# Patient Record
Sex: MALE | Race: WHITE | NOT HISPANIC OR LATINO | Employment: FULL TIME | URBAN - METROPOLITAN AREA
[De-identification: names, ages, dates, MRNs, and addresses within clinical notes are randomized per-mention and may not be internally consistent; named-entity substitution may affect disease eponyms.]

---

## 2017-01-04 PROBLEM — F52.21 ED (ERECTILE DYSFUNCTION) OF NON-ORGANIC ORIGIN: Status: ACTIVE | Noted: 2017-01-04

## 2017-01-04 PROBLEM — I10 HYPERTENSION: Status: ACTIVE | Noted: 2017-01-04

## 2017-01-12 ENCOUNTER — OFFICE VISIT (OUTPATIENT)
Dept: FAMILY MEDICINE CLINIC | Facility: CLINIC | Age: 47
End: 2017-01-12

## 2017-01-12 VITALS
HEART RATE: 90 BPM | HEIGHT: 69 IN | OXYGEN SATURATION: 96 % | WEIGHT: 212 LBS | DIASTOLIC BLOOD PRESSURE: 100 MMHG | SYSTOLIC BLOOD PRESSURE: 140 MMHG | TEMPERATURE: 98.3 F | BODY MASS INDEX: 31.4 KG/M2

## 2017-01-12 DIAGNOSIS — R31.9 HEMATURIA: Primary | ICD-10-CM

## 2017-01-12 DIAGNOSIS — I10 ESSENTIAL HYPERTENSION: ICD-10-CM

## 2017-01-12 DIAGNOSIS — M54.42 ACUTE LEFT-SIDED LOW BACK PAIN WITH LEFT-SIDED SCIATICA: ICD-10-CM

## 2017-01-12 LAB
BILIRUB BLD-MCNC: NEGATIVE MG/DL
CLARITY, POC: CLEAR
COLOR UR: YELLOW
GLUCOSE UR STRIP-MCNC: NEGATIVE MG/DL
KETONES UR QL: NEGATIVE
LEUKOCYTE EST, POC: ABNORMAL
NITRITE UR-MCNC: NEGATIVE MG/ML
PH UR: 5.5 [PH] (ref 5–8)
PROT UR STRIP-MCNC: NEGATIVE MG/DL
RBC # UR STRIP: ABNORMAL /UL
SP GR UR: 1.01 (ref 1–1.03)
UROBILINOGEN UR QL: NORMAL

## 2017-01-12 PROCEDURE — 99214 OFFICE O/P EST MOD 30 MIN: CPT | Performed by: FAMILY MEDICINE

## 2017-01-12 PROCEDURE — 81003 URINALYSIS AUTO W/O SCOPE: CPT | Performed by: FAMILY MEDICINE

## 2017-01-12 RX ORDER — GABAPENTIN 300 MG/1
300 CAPSULE ORAL 2 TIMES DAILY
Qty: 30 CAPSULE | Refills: 0 | Status: SHIPPED | OUTPATIENT
Start: 2017-01-12 | End: 2017-07-24

## 2017-01-12 RX ORDER — AMLODIPINE BESYLATE 5 MG/1
5 TABLET ORAL DAILY
Qty: 30 TABLET | Refills: 2 | Status: SHIPPED | OUTPATIENT
Start: 2017-01-12 | End: 2017-12-28

## 2017-01-12 RX ORDER — NAPROXEN 500 MG/1
500 TABLET ORAL 2 TIMES DAILY WITH MEALS
Qty: 30 TABLET | Refills: 0 | Status: SHIPPED | OUTPATIENT
Start: 2017-01-12 | End: 2017-07-24

## 2017-01-12 NOTE — PROGRESS NOTES
"Subjective   Donte Leyva is a 46 y.o. male.     History of Present Illness   The patient is here for a follow up on moderate low back pain that is radiating down the left leg. The leg pain does seem to be associated with the back pain. He does have discomfort in getting up and out of a chair or out of the bed. The low back pain is moderately severe. Urination and bowel habits of been normal.    The pain is described as a aching/burning pain. Tingling in the left leg but no noted numbness.    BP today is 140/100. And 155/100 on my recheck. No chest pain or shortness of breath.  He is taking Losartan 50 mg daily.    Urinalysis today does reveal  a trace of blood. No dysuria    The following portions of the patient's history were reviewed and updated as appropriate: allergies, current medications, past social history and problem list.    Review of Systems   Respiratory: Negative for shortness of breath.    Cardiovascular: Negative for chest pain, palpitations and leg swelling.   Musculoskeletal: Positive for back pain (lower left).   Neurological: Negative for tremors, syncope and light-headedness.       Objective   Visit Vitals   • /100   • Pulse 90   • Temp 98.3 °F (36.8 °C)   • Ht 69\" (175.3 cm)   • Wt 212 lb (96.2 kg)   • SpO2 96%   • BMI 31.31 kg/m2     Physical Exam   Constitutional: He is oriented to person, place, and time. He appears well-developed and well-nourished.   Cardiovascular: Normal rate, regular rhythm and normal heart sounds.    No murmur heard.  Pulmonary/Chest: Effort normal and breath sounds normal. No respiratory distress.   Neurological: He is alert and oriented to person, place, and time. He has normal reflexes.   Negative straight leg raising.   Nursing note and vitals reviewed.      Assessment/Plan   Problem List Items Addressed This Visit        Cardiovascular and Mediastinum    Hypertension    Relevant Medications    amLODIPine (NORVASC) 5 MG tablet    Other Relevant Orders    US " Renal Bilateral (Completed)      Other Visit Diagnoses     Hematuria    -  Primary    Relevant Orders    POC Urinalysis Dipstick, Automated (Completed)    US Renal Bilateral (Completed)    Acute left-sided low back pain with left-sided sciatica        Relevant Orders    XR Spine Lumbar 2 or 3 View (Completed)              Drink plenty fluids.    Stay off work through Sunday may return to work on Monday.    Continue Losartan 50 mg daily. Rx for Amlodipine 5 mg daily #30+2.    Gabapentin 300 mg twice a day #30+0 and Naproxen 500 mg twice a day #30+0.    Check an Ultra sound of both kidneys, and Lumbar spine x-ray.    Follow up in 2 weeks.                Scribed for Dr Catrachito Robles by Lisa Garcia CMA.    I, Catrachito Robles MD, personally performed the services described in this documentation, as scribed by Lisa Garcia in my presence, and is both accurate and complete.

## 2017-01-12 NOTE — MR AVS SNAPSHOT
Donte Leyva   2017 4:15 PM   Office Visit    Provider:  Catrachito Robles MD   Department:  [facility]   Dept Phone:  [phone]                Your Full Care Plan              Your Updated Medication List          This list is accurate as of: 17  6:08 PM.  Always use your most recent med list.                losartan 50 MG tablet   Commonly known as:  COZAAR       tadalafil 5 MG tablet   Commonly known as:  CIALIS   Take 1 tablet by mouth Daily As Needed for erectile dysfunction.               We Performed the Following     POC Urinalysis Dipstick, Automated       You Were Diagnosed With        Codes Comments    Hematuria    -  Primary ICD-10-CM: R31.9  ICD-9-CM: 599.70       Instructions     None    Patient Instructions History      vidIQ Signup     [facility] [employer] allows you to send messages to your doctor, view your test results, renew your prescriptions, schedule appointments, and more. To sign up, go to iAmplify and click on the Sign Up Now link in the New User? box. Enter your vidIQ Activation Code exactly as it appears below along with the last four digits of your Social Security Number and your Date of Birth () to complete the sign-up process. If you do not sign up before the expiration date, you must request a new code.    vidIQ Activation Code: [email or web address]  Expires: 2017  6:08 PM    If you have questions, you can email [email or web address] or call [phone] to talk to our vidIQ staff. Remember, vidIQ is NOT to be used for urgent needs. For medical emergencies, dial 911.               Other Info from Your Visit           Your Appointments     2017  2:30 PM EST   Follow Up with Catrachito Robles MD   [facility] (--)    [address] [place] [address] [address] [place] [place] [address] [id number] [phone]           Arrive 15 minutes prior to appointment.    "             Allergies     No Known Allergies      Reason for Visit     Follow-up           Vital Signs     Blood Pressure Pulse Temperature Height Weight Oxygen Saturation    140/100 90 98.3 °F (36.8 °C) 69\" (175.3 cm) 212 lb (96.2 kg) 96%    Body Mass Index Smoking Status                31.31 kg/m2 Never Smoker          Problems and Diagnoses Noted     Blood in urine    -  Primary      Results     POC Urinalysis Dipstick, Automated      Component Value Standard Range & Units    Color Yellow Yellow, Straw, Dark Yellow, [place]    Clarity, UA Clear Clear    Glucose, UA Negative Negative, 1000 mg/dL (3+) mg/dL    Bilirubin Negative Negative    Ketones, UA Negative Negative    Specific Gravity  1.015 1.005 - 1.030    Blood, UA Trace Negative    pH, Urine 5.5 5.0 - 8.0    Protein, POC Negative Negative mg/dL    Urobilinogen, UA Normal Normal    Leukocytes Trace Negative    Nitrite, UA Negative Negative                  "

## 2017-01-13 ENCOUNTER — TELEPHONE (OUTPATIENT)
Dept: FAMILY MEDICINE CLINIC | Facility: CLINIC | Age: 47
End: 2017-01-13

## 2017-01-13 NOTE — TELEPHONE ENCOUNTER
----- Message from Anne Marie Raymond sent at 1/13/2017  1:54 PM EST -----  Regarding: MED RESEND  Contact: [phone]  PATIENT HAD THREE MEDICATIONS SENT TO [facility] BUT [facility] STATES THEY DID NOT RECEIVE THESE. PLEASE RESEND TO [facility]

## 2017-01-19 ENCOUNTER — HOSPITAL ENCOUNTER (OUTPATIENT)
Dept: ULTRASOUND IMAGING | Facility: HOSPITAL | Age: 47
Discharge: HOME | End: 2017-01-19
Attending: FAMILY MEDICINE | Admitting: FAMILY MEDICINE

## 2017-01-19 ENCOUNTER — HOSPITAL ENCOUNTER (OUTPATIENT)
Dept: GENERAL RADIOLOGY | Facility: HOSPITAL | Age: 47
Discharge: HOME | End: 2017-01-19
Attending: FAMILY MEDICINE

## 2017-01-19 DIAGNOSIS — M54.42 ACUTE LEFT-SIDED LOW BACK PAIN WITH LEFT-SIDED SCIATICA: ICD-10-CM

## 2017-01-19 DIAGNOSIS — I10 ESSENTIAL HYPERTENSION: ICD-10-CM

## 2017-01-19 DIAGNOSIS — R31.9 HEMATURIA: ICD-10-CM

## 2017-01-19 PROCEDURE — 76775 US EXAM ABDO BACK WALL LIM: CPT

## 2017-01-19 PROCEDURE — 72100 X-RAY EXAM L-S SPINE 2/3 VWS: CPT

## 2017-01-26 ENCOUNTER — OFFICE VISIT (OUTPATIENT)
Dept: FAMILY MEDICINE CLINIC | Facility: CLINIC | Age: 47
End: 2017-01-26

## 2017-01-26 VITALS
TEMPERATURE: 98.3 F | DIASTOLIC BLOOD PRESSURE: 92 MMHG | OXYGEN SATURATION: 95 % | HEART RATE: 91 BPM | BODY MASS INDEX: 31.1 KG/M2 | HEIGHT: 69 IN | SYSTOLIC BLOOD PRESSURE: 132 MMHG | WEIGHT: 210 LBS

## 2017-01-26 DIAGNOSIS — M54.50 ACUTE BILATERAL LOW BACK PAIN WITHOUT SCIATICA: Primary | ICD-10-CM

## 2017-01-26 PROCEDURE — 99213 OFFICE O/P EST LOW 20 MIN: CPT | Performed by: FAMILY MEDICINE

## 2017-01-26 NOTE — MR AVS SNAPSHOT
Donte Leyva   2017 2:30 PM   Office Visit    Provider:  Catrachito Robles MD   Department:  [facility]   Dept Phone:  [phone]                Your Full Care Plan              Your Updated Medication List          This list is accurate as of: 17  3:15 PM.  Always use your most recent med list.                amLODIPine 5 MG tablet   Commonly known as:  NORVASC   Take 1 tablet by mouth Daily.       gabapentin 300 MG capsule   Commonly known as:  NEURONTIN   Take 1 capsule by mouth 2 (Two) Times a Day.       losartan 50 MG tablet   Commonly known as:  COZAAR       naproxen 500 MG tablet   Commonly known as:  NAPROSYN   Take 1 tablet by mouth 2 (Two) Times a Day With Meals.       tadalafil 5 MG tablet   Commonly known as:  CIALIS   Take 1 tablet by mouth Daily As Needed for erectile dysfunction.               You Were Diagnosed With        Codes Comments    Acute bilateral low back pain without sciatica    -  Primary ICD-10-CM: M54.5  ICD-9-CM: 724.2, 338.19       Instructions     None    Patient Instructions History      Degreed[employer] Signup     [facility] [employer]teeda allows you to send messages to your doctor, view your test results, renew your prescriptions, schedule appointments, and more. To sign up, go to Codenvy and click on the Sign Up Now link in the New User? box. Enter your Ateeda Activation Code exactly as it appears below along with the last four digits of your Social Security Number and your Date of Birth () to complete the sign-up process. If you do not sign up before the expiration date, you must request a new code.    Ateeda Activation Code: [email or web address]  Expires: 2017  6:08 PM    If you have questions, you can email [email or web address] ([email or web address]) or call [phone] to talk to our Ateeda staff. Remember, Ateeda is NOT to be used for urgent needs. For medical emergencies, dial 911.               Other  "Info from Your Visit           Allergies     No Known Allergies      Reason for Visit     Follow-up ultrasound, xray       Vital Signs     Blood Pressure Pulse Temperature Height Weight Oxygen Saturation    132/92 91 98.3 °F (36.8 °C) 69\" (175.3 cm) 210 lb (95.3 kg) 95%    Body Mass Index Smoking Status                31.01 kg/m2 Never Smoker          Problems and Diagnoses Noted     Acute bilateral low back pain without sciatica    -  Primary      "

## 2017-01-26 NOTE — PROGRESS NOTES
"Subjective   Donte Leyva is a 46 y.o. male.     History of Present Illness   The patient is here for a follow up on Low back pain. Renal ultrasound revealed no abnormalities. Lizabeth's today is negative for blood. Trace of thoracic spine were unremarkable.    He states he is doing better.  Does have episodes of severe back pain. Worse on the right side. Last episode was Friday.        The following portions of the patient's history were reviewed and updated as appropriate: allergies, current medications, past social history and problem list.    Review of Systems   Gastrointestinal: Negative for constipation and diarrhea.   Genitourinary: Negative for dysuria, flank pain and hematuria.   Musculoskeletal: Positive for back pain. Negative for arthralgias, joint swelling, myalgias and neck pain.   Neurological: Negative for numbness.       Objective   Visit Vitals   • /92   • Pulse 91   • Temp 98.3 °F (36.8 °C)   • Ht 69\" (175.3 cm)   • Wt 210 lb (95.3 kg)   • SpO2 95%   • BMI 31.01 kg/m2     Physical Exam   Constitutional: He is oriented to person, place, and time. He appears well-developed and well-nourished.   Musculoskeletal: He exhibits tenderness. He exhibits no edema.   Paralumbar spasm.   Neurological: He is alert and oriented to person, place, and time. He has normal reflexes.   Skin: Skin is warm and dry.   Nursing note and vitals reviewed.      Assessment/Plan   Problem List Items Addressed This Visit     None      Visit Diagnoses     Acute bilateral low back pain without sciatica    -  Primary              Drink plenty fluids.    Do back exercises daily.    Use Aleve 2 twice a day if needed. No more than 4-5 days in one month.    Follow up as needed. Check an MRI if the pain worsens              Scribed for Dr Catrachito Robles by Lisa Garcia [facility].    I, Catrachito Robles MD, personally performed the services described in this documentation, as scribed by Lisa Garcia in my presence, and is both accurate " and complete.

## 2017-03-08 ENCOUNTER — TELEPHONE (OUTPATIENT)
Dept: FAMILY MEDICINE CLINIC | Facility: CLINIC | Age: 47
End: 2017-03-08

## 2017-03-08 NOTE — TELEPHONE ENCOUNTER
----- Message from Latricia Bhatia sent at 3/8/2017  9:52 AM EST -----  Regarding: MEDICAL QUESTION  Contact: [phone]  PT STATES THAT HE HAS A KIDNEY INFECTION AND WOULD LIKE TO KNOW IF DR. APARICIO CAN CALL HIM IN AN ANTIBIOTIC.    GARETH ENNIS [facility]

## 2017-04-17 RX ORDER — TADALAFIL 5 MG/1
5 TABLET ORAL DAILY PRN
Qty: 30 TABLET | Refills: 0 | Status: SHIPPED | OUTPATIENT
Start: 2017-04-17 | End: 2017-05-31 | Stop reason: SDUPTHER

## 2017-05-31 ENCOUNTER — TELEPHONE (OUTPATIENT)
Dept: FAMILY MEDICINE CLINIC | Facility: CLINIC | Age: 47
End: 2017-05-31

## 2017-05-31 RX ORDER — TADALAFIL 5 MG/1
5 TABLET ORAL DAILY PRN
Qty: 30 TABLET | Refills: 0 | Status: SHIPPED | OUTPATIENT
Start: 2017-05-31 | End: 2017-07-24 | Stop reason: SDUPTHER

## 2017-07-24 ENCOUNTER — OFFICE VISIT (OUTPATIENT)
Dept: FAMILY MEDICINE CLINIC | Facility: CLINIC | Age: 47
End: 2017-07-24

## 2017-07-24 VITALS
HEART RATE: 93 BPM | SYSTOLIC BLOOD PRESSURE: 130 MMHG | BODY MASS INDEX: 29.92 KG/M2 | DIASTOLIC BLOOD PRESSURE: 90 MMHG | OXYGEN SATURATION: 97 % | TEMPERATURE: 98.5 F | HEIGHT: 69 IN | WEIGHT: 202 LBS

## 2017-07-24 DIAGNOSIS — I10 ESSENTIAL HYPERTENSION: Primary | ICD-10-CM

## 2017-07-24 DIAGNOSIS — F52.21 ED (ERECTILE DYSFUNCTION) OF NON-ORGANIC ORIGIN: ICD-10-CM

## 2017-07-24 PROCEDURE — 99213 OFFICE O/P EST LOW 20 MIN: CPT | Performed by: FAMILY MEDICINE

## 2017-07-24 RX ORDER — TADALAFIL 5 MG/1
5 TABLET ORAL DAILY PRN
Qty: 3 TABLET | Refills: 5 | Status: SHIPPED | OUTPATIENT
Start: 2017-07-24 | End: 2017-09-08 | Stop reason: SDUPTHER

## 2017-07-24 RX ORDER — LOSARTAN POTASSIUM 100 MG/1
100 TABLET ORAL DAILY
Qty: 30 TABLET | Refills: 5 | Status: SHIPPED | OUTPATIENT
Start: 2017-07-24 | End: 2017-12-28 | Stop reason: SDUPTHER

## 2017-07-24 NOTE — PROGRESS NOTES
"Subjective   Donte Leyva is a 46 y.o. male.     History of Present Illness   The patient is here for a follow up on Hypertension and ED    States he is doing well.  Denies any chest pain or shortness of breath.    BP today is 130/90. 125/95 sitting and 140/98 standing on my recheck.  Taking Losartan 50 mg and Amlodipine 5 mg daily.    Taking Cialis 5 mg as needed.    The following portions of the patient's history were reviewed and updated as appropriate: allergies, current medications, past social history and problem list.    Review of Systems   Constitutional: Negative for unexpected weight change.   Respiratory: Negative for cough and shortness of breath.    Cardiovascular: Negative for chest pain and leg swelling.       Objective   /90  Pulse 93  Temp 98.5 °F (36.9 °C)  Ht 69\" (175.3 cm)  Wt 202 lb (91.6 kg)  SpO2 97%  BMI 29.83 kg/m2  Physical Exam   Constitutional: He appears well-developed and well-nourished.   Eyes: Pupils are equal, round, and reactive to light.   Neck: Normal range of motion. Neck supple. No thyromegaly present.   Cardiovascular: Normal rate, regular rhythm and normal heart sounds.    No murmur heard.  Pulmonary/Chest: Effort normal and breath sounds normal. He has no wheezes. He has no rales.   Lymphadenopathy:     He has no cervical adenopathy.   Nursing note and vitals reviewed.      Assessment/Plan   Problem List Items Addressed This Visit        Cardiovascular and Mediastinum    Hypertension - Primary       Other    ED (erectile dysfunction) of non-organic origin              Drink plenty fluids.    Increase the Losartan to 100 mg daily. #30+5.    Continue other medications as doing.    Follow up in 4 months fasting for general lab studies.        Scribed for Dr Catrachito Rolbes by Lisa Garcia CMA.          I, Catrachito Robles MD, personally performed the services described in this documentation, as scribed by Lisa Garcia in my presence, and is both accurate and " complete.

## 2017-09-08 DIAGNOSIS — F52.21 ED (ERECTILE DYSFUNCTION) OF NON-ORGANIC ORIGIN: ICD-10-CM

## 2017-09-08 RX ORDER — TADALAFIL 5 MG/1
5 TABLET ORAL DAILY PRN
Qty: 3 TABLET | Refills: 5 | Status: SHIPPED | OUTPATIENT
Start: 2017-09-08 | End: 2017-10-18 | Stop reason: SDUPTHER

## 2017-10-18 ENCOUNTER — TELEPHONE (OUTPATIENT)
Dept: FAMILY MEDICINE CLINIC | Facility: CLINIC | Age: 47
End: 2017-10-18

## 2017-10-18 DIAGNOSIS — F52.21 ED (ERECTILE DYSFUNCTION) OF NON-ORGANIC ORIGIN: ICD-10-CM

## 2017-10-18 RX ORDER — TADALAFIL 5 MG/1
5 TABLET ORAL DAILY PRN
Qty: 3 TABLET | Refills: 5 | Status: SHIPPED | OUTPATIENT
Start: 2017-10-18 | End: 2017-12-28 | Stop reason: SDUPTHER

## 2017-12-04 RX ORDER — TADALAFIL 5 MG/1
5 TABLET ORAL DAILY PRN
Qty: 10 TABLET | Refills: 0 | Status: SHIPPED | OUTPATIENT
Start: 2017-12-04 | End: 2017-12-20 | Stop reason: SDUPTHER

## 2017-12-20 RX ORDER — TADALAFIL 5 MG/1
5 TABLET ORAL DAILY PRN
Qty: 10 TABLET | Refills: 0 | Status: SHIPPED | OUTPATIENT
Start: 2017-12-20 | End: 2017-12-28 | Stop reason: SDUPTHER

## 2017-12-28 ENCOUNTER — OFFICE VISIT (OUTPATIENT)
Dept: FAMILY MEDICINE CLINIC | Facility: CLINIC | Age: 47
End: 2017-12-28

## 2017-12-28 VITALS
DIASTOLIC BLOOD PRESSURE: 98 MMHG | SYSTOLIC BLOOD PRESSURE: 166 MMHG | HEART RATE: 95 BPM | OXYGEN SATURATION: 98 % | BODY MASS INDEX: 31.4 KG/M2 | WEIGHT: 212 LBS | HEIGHT: 69 IN | TEMPERATURE: 99.1 F

## 2017-12-28 DIAGNOSIS — R40.0 DAYTIME SOMNOLENCE: ICD-10-CM

## 2017-12-28 DIAGNOSIS — F52.21 ED (ERECTILE DYSFUNCTION) OF NON-ORGANIC ORIGIN: ICD-10-CM

## 2017-12-28 DIAGNOSIS — I10 ESSENTIAL HYPERTENSION: ICD-10-CM

## 2017-12-28 DIAGNOSIS — M54.32 SCIATICA OF LEFT SIDE: Primary | ICD-10-CM

## 2017-12-28 PROCEDURE — 99214 OFFICE O/P EST MOD 30 MIN: CPT | Performed by: FAMILY MEDICINE

## 2017-12-28 RX ORDER — LOSARTAN POTASSIUM 100 MG/1
100 TABLET ORAL DAILY
Qty: 30 TABLET | Refills: 11 | Status: SHIPPED | OUTPATIENT
Start: 2017-12-28 | End: 2018-04-17 | Stop reason: SDUPTHER

## 2017-12-28 RX ORDER — TADALAFIL 5 MG/1
5 TABLET ORAL DAILY PRN
Qty: 30 TABLET | Refills: 5 | Status: SHIPPED | OUTPATIENT
Start: 2017-12-28 | End: 2018-02-14 | Stop reason: ALTCHOICE

## 2017-12-28 RX ORDER — INDAPAMIDE 2.5 MG/1
2.5 TABLET, FILM COATED ORAL EVERY MORNING
Qty: 30 TABLET | Refills: 5 | Status: SHIPPED | OUTPATIENT
Start: 2017-12-28 | End: 2018-03-05

## 2017-12-28 NOTE — PROGRESS NOTES
"Subjective   Donte Leyva is a 47 y.o. male.     History of Present Illness   The patient is here today with c/o left sided back pain that radiates down the left leg into the left foot.    States this has been off and on since January.  States that sometimes he will have a painful knot pop up on the left lower back.  States this comes and goes.    Also a follow up on Hypertension.  BP today is 166/98.  Taking Losartan 100 mg daily.    States his wife has noticed he will stop breathing several times and snoring a lot.  States he is have day time drowsiness everyday.    The following portions of the patient's history were reviewed and updated as appropriate: allergies, current medications, past social history and problem list.    Review of Systems   Respiratory: Negative for shortness of breath.    Cardiovascular: Negative for chest pain.   Musculoskeletal: Positive for back pain.   Psychiatric/Behavioral:        Daytime drowsiness         Objective   /98  Pulse 95  Temp 99.1 °F (37.3 °C)  Ht 175.3 cm (69.02\")  Wt 96.2 kg (212 lb)  SpO2 98%  BMI 31.29 kg/m2  Physical Exam   Constitutional: He appears well-developed and well-nourished.   Musculoskeletal:   Positive straight leg raising left side.  Diminished reflex left patellar.  Referred pain left L5 dermatome to left leg.   Nursing note and vitals reviewed.      Assessment/Plan   Problem List Items Addressed This Visit        Cardiovascular and Mediastinum    Hypertension       Other    ED (erectile dysfunction) of non-organic origin      Other Visit Diagnoses     Sciatica of left side    -  Primary    Daytime somnolence                  Drink plenty fluids.    Add Indapamide 2.5 mg daily #30+5.    Continue Losartan 100 mg daily #30+11.    Refill  Cialis #30+5.    Check an MRI or the lumbar spine.    Refer to sleep clinic for a sleep study.    Follow up in 3 weeks fasting.            Scribed for Dr Catrachito Robles by Lisa Garcia CMA.            I, " Catrachito Robles MD, personally performed the services described in this documentation, as scribed by Lisa Garcia in my presence, and is both accurate and complete.

## 2018-01-08 ENCOUNTER — HOSPITAL ENCOUNTER (OUTPATIENT)
Dept: MRI IMAGING | Facility: HOSPITAL | Age: 48
Discharge: HOME | End: 2018-01-08
Attending: FAMILY MEDICINE | Admitting: FAMILY MEDICINE

## 2018-01-08 DIAGNOSIS — M54.32 SCIATICA OF LEFT SIDE: ICD-10-CM

## 2018-01-08 PROCEDURE — 72148 MRI LUMBAR SPINE W/O DYE: CPT

## 2018-01-15 ENCOUNTER — TELEPHONE (OUTPATIENT)
Dept: FAMILY MEDICINE CLINIC | Facility: CLINIC | Age: 48
End: 2018-01-15

## 2018-01-15 NOTE — TELEPHONE ENCOUNTER
I left a message for the patient on his telephone.  I advised that his MRI did reveal a ruptured disc at the L3-L4 level and it did appear that it was impinging on some of the neural tissue.  Therefore, I recommended referral to a neurosurgeon such as at neurosurgery [facility].  I told the patient that should he want to pursue that to give our office a call and we will place the referral.

## 2018-01-18 ENCOUNTER — OFFICE VISIT (OUTPATIENT)
Dept: FAMILY MEDICINE CLINIC | Facility: CLINIC | Age: 48
End: 2018-01-18

## 2018-01-18 VITALS
HEIGHT: 69 IN | HEART RATE: 115 BPM | BODY MASS INDEX: 31.1 KG/M2 | DIASTOLIC BLOOD PRESSURE: 72 MMHG | OXYGEN SATURATION: 96 % | TEMPERATURE: 98.6 F | SYSTOLIC BLOOD PRESSURE: 114 MMHG | WEIGHT: 210 LBS

## 2018-01-18 DIAGNOSIS — M51.26 HERNIATED LUMBAR INTERVERTEBRAL DISC: Primary | ICD-10-CM

## 2018-01-18 PROCEDURE — 99214 OFFICE O/P EST MOD 30 MIN: CPT | Performed by: FAMILY MEDICINE

## 2018-01-18 RX ORDER — GABAPENTIN 300 MG/1
300 CAPSULE ORAL 2 TIMES DAILY
Qty: 60 CAPSULE | Refills: 0
Start: 2018-01-18 | End: 2018-03-05

## 2018-01-18 NOTE — PROGRESS NOTES
"Subjective   Donte Leyva is a 47 y.o. male.     History of Present Illness   The patient is here for a follow up on left sided sciatica and MRI results.  Mri reveals herniated disc in lumbar region with neural impingement on left side.    States he is   Denies any chest pain or shortness of breath.    BP today is 114/72.  Taking Losartan 100 mg and Indapamide 1.25 mg daily.  Denies any medication side effects.    The following portions of the patient's history were reviewed and updated as appropriate: allergies, current medications, past social history and problem list.    Review of Systems   Respiratory: Negative for shortness of breath.    Cardiovascular: Negative for chest pain.   Musculoskeletal: Positive for back pain.       Objective   /72  Pulse 115  Temp 98.6 °F (37 °C)  Ht 175.3 cm (69.02\")  Wt 95.3 kg (210 lb)  SpO2 96%  BMI 31 kg/m2  Physical Exam   Constitutional: He is oriented to person, place, and time. He appears well-developed and well-nourished.   Neck: Normal range of motion.   Cardiovascular: Normal rate and regular rhythm.    Pulmonary/Chest: Effort normal and breath sounds normal.   Neurological: He is alert and oriented to person, place, and time.   Decreased patellar reflex on left side   Nursing note and vitals reviewed.      Assessment/Plan   Problem List Items Addressed This Visit     None      Visit Diagnoses     Herniated lumbar intervertebral disc    -  Primary    Relevant Orders    Ambulatory Referral to Neurosurgery (Completed)              Drink plenty fluids.  Get plenty fruits and vegetables in diet.    Written Rx for Gabapentin 300 mg twice a day #60+0.  [facility] #[id number] reviewed.    Refer to Neurosurgery at [facility] with Dr Irvin or Dr Dodson.    Follow up as needed.                Scribed for Dr Catrachito Robles by Lisa Garcia [facility].          I, Catrachito Robles MD, personally performed the services described in this documentation, as scribed by Lisa Garcia in " my presence, and is both accurate and complete.

## 2018-02-14 ENCOUNTER — TELEPHONE (OUTPATIENT)
Dept: FAMILY MEDICINE CLINIC | Facility: CLINIC | Age: 48
End: 2018-02-14

## 2018-02-14 RX ORDER — SILDENAFIL 100 MG/1
100 TABLET, FILM COATED ORAL DAILY PRN
Qty: 5 TABLET | Refills: 5 | Status: SHIPPED | OUTPATIENT
Start: 2018-02-14 | End: 2019-01-11

## 2018-02-14 NOTE — TELEPHONE ENCOUNTER
Dr jones sent in viagra for pt and discontinued the cialis. Pt has been notified.   ----- Message from Anne Marie Gonsalez MA sent at 2/12/2018  4:55 PM EST -----  Contact: [phone]  Pt would like an rx for viagra? Please advise? [facility] in [place], [place].

## 2018-02-15 ENCOUNTER — TELEPHONE (OUTPATIENT)
Dept: FAMILY MEDICINE CLINIC | Facility: CLINIC | Age: 48
End: 2018-02-15

## 2018-02-15 NOTE — TELEPHONE ENCOUNTER
Rx was printed instead of called in. This was taken care of by myself. Pt was notified.  ----- Message from Malcolm Paris sent at 2/15/2018  9:07 AM EST -----  Regarding: RX NEEDS TO BE SENT TO PHARMACY  Contact: [phone]  PT CALLED SAYING THAT THE RX sildenafil (VIAGRA) 100 MG tablet DIDN'T GET SENT TO PHARMACY  [facility]

## 2018-03-05 ENCOUNTER — CONSULT (OUTPATIENT)
Dept: SLEEP MEDICINE | Facility: HOSPITAL | Age: 48
End: 2018-03-05

## 2018-03-05 VITALS
BODY MASS INDEX: 32.14 KG/M2 | OXYGEN SATURATION: 96 % | WEIGHT: 217 LBS | SYSTOLIC BLOOD PRESSURE: 168 MMHG | HEART RATE: 81 BPM | DIASTOLIC BLOOD PRESSURE: 102 MMHG | HEIGHT: 69 IN

## 2018-03-05 DIAGNOSIS — G47.10 HYPERSOMNIA: Primary | ICD-10-CM

## 2018-03-05 DIAGNOSIS — I10 ESSENTIAL HYPERTENSION: ICD-10-CM

## 2018-03-05 DIAGNOSIS — G25.81 RESTLESS LEGS SYNDROME (RLS): ICD-10-CM

## 2018-03-05 DIAGNOSIS — R29.818 SUSPECTED SLEEP APNEA: ICD-10-CM

## 2018-03-05 PROCEDURE — 99243 OFF/OP CNSLTJ NEW/EST LOW 30: CPT | Performed by: INTERNAL MEDICINE

## 2018-03-05 NOTE — PROGRESS NOTES
Donte Leyva is a 47 y.o. male.   Chief Complaint   Patient presents with   • Sleeping Problem       HPI     47 y.o. male seen in consultation at the request of Catrachito Robles MD for evaluation of the above.     Patient with a 20 year history of worsening sleep quality and daytime somnolence.  He now has frequent awakenings as well as morning headaches.  His sleep was very fragmented.  He is increasingly somnolent during the day.  His partner has observed apneas and snoring.    Further details are as follows:    Columbus Scale is: 13/24    Estimated average amount of sleep per night: 4  Number of times he wakes up at night: 8  Difficulty falling back asleep: yes  It usually takes 25 minutes to go to sleep.  He feels sleepy upon waking up: yes  Rotating or night shift work: no    Drowsiness/Sleepiness:  He exhibits the following:  excessive daytime sleepiness  excessive daytime fatigue  falls asleep watching TV  falls asleep during times of the day when he is quiet  difficulty driving due to sleepiness  sleepy even while on vacation  sleepy even when sleep time is increased    Snoring/Breathing:  He exhibits the following:  loud snoring  snores in all sleep positions  awoken with dry mouth  quits breathing at night  awakens gasping for breath  awakens with respiratory discomfort  trouble breathing through nose at night  morning headaches    Reflux:  He describes the following:  wakes up at night with a sour taste or burning sensation in chest    Narcolepsy:  He exhibits the following:  feeling of paralysis while going to sleep or coming out of sleep    RLS/PLMs:  He describes the following:  moves or jerks during sleep  discomfort in legs with an urge to move them    Insomnia:  He describes the following:  problems initiating sleep at night  frequent awakenings  bothered by pain at night  restless sleep    Parasomnia:  He exhibits the following:  sleep talks  acts out dreams  wakes up screaming at  "night  frequent nightmares  excessive sweating while sleeping    Neurological:  He has a history of the following:  none    Weight:  Weight change in the last year:  gain: 0 lbs      The patient's relevant past medical, surgical, family, and social history reviewed and updated in Epic as appropriate.    Current medications are:   Current Outpatient Prescriptions:   •  losartan (COZAAR) 100 MG tablet, Take 1 tablet by mouth Daily., Disp: 30 tablet, Rfl: 11  •  sildenafil (VIAGRA) 100 MG tablet, Take 1 tablet by mouth Daily As Needed for erectile dysfunction (Take one half tablet once a day as needed)., Disp: 5 tablet, Rfl: 5.    Review of Systems    Review of Systems  ROS documented in patient questionnaire ×12 systems.  Reviewed with patient.  Otherwise negative except as noted in HPI.    Physical Exam    Blood pressure (!) 168/102, pulse 81, height 175.3 cm (69.02\"), weight 98.4 kg (217 lb), SpO2 96 %. Body mass index is 32.03 kg/(m^2).    Physical Exam   Constitutional: He is oriented to person, place, and time. He appears well-developed and well-nourished.   HENT:   Head: Normocephalic and atraumatic.   Nose: Nose normal.   Mouth/Throat: Oropharynx is clear and moist. No oropharyngeal exudate.   Class II airway   Eyes: Conjunctivae are normal. No scleral icterus.   Neck: No tracheal deviation present. No thyromegaly present.   Cardiovascular: Normal rate and regular rhythm.  Exam reveals no gallop and no friction rub.    No murmur heard.  Pulmonary/Chest: Effort normal. No respiratory distress. He has no wheezes. He has no rales.   Musculoskeletal: He exhibits no edema or deformity.   Neurological: He is alert and oriented to person, place, and time.   Skin: Skin is warm and dry. No rash noted.   Psychiatric: He has a normal mood and affect. His behavior is normal.   Nursing note and vitals reviewed.      ASSESSMENT:    Problem List Items Addressed This Visit     Suspected sleep apnea    Relevant Orders    Home " Sleep Study    Hypersomnia - Primary    Relevant Orders    Home Sleep Study    Restless legs syndrome (RLS)    Hypertension          Evidence of obstructive sleep apnea based upon snoring, witnessed apneas, daytime somnolence, and morning headaches.    PLAN:    1. I have discussed the probable diagnosis of obstructive sleep apnea with the patient and gone over the diagnostic process as well as potential treatments.  2. We discussed weight loss as a primary lifestyle intervention  3. We discussed CPAP therapy as a likely option.  4. We will make further recommendations after his home sleep study is obtained    I have reviewed the results of my evaluation and impression and discussed my recommendations in detail with the patient.      Signed by  Alphonso Collier MD    March 5, 2018      CC: MD Travis Shah Timothy R, MD

## 2018-04-05 ENCOUNTER — HOSPITAL ENCOUNTER (OUTPATIENT)
Dept: SLEEP MEDICINE | Facility: HOSPITAL | Age: 48
Discharge: HOME | End: 2018-04-05
Attending: INTERNAL MEDICINE | Admitting: INTERNAL MEDICINE

## 2018-04-05 VITALS
OXYGEN SATURATION: 95 % | SYSTOLIC BLOOD PRESSURE: 153 MMHG | HEIGHT: 69 IN | BODY MASS INDEX: 32.53 KG/M2 | HEART RATE: 96 BPM | DIASTOLIC BLOOD PRESSURE: 95 MMHG | WEIGHT: 219.6 LBS

## 2018-04-05 DIAGNOSIS — R29.818 SUSPECTED SLEEP APNEA: ICD-10-CM

## 2018-04-05 DIAGNOSIS — G47.10 HYPERSOMNIA: ICD-10-CM

## 2018-04-05 PROCEDURE — 95806 SLEEP STUDY UNATT&RESP EFFT: CPT | Performed by: INTERNAL MEDICINE

## 2018-04-05 PROCEDURE — 95806 SLEEP STUDY UNATT&RESP EFFT: CPT

## 2018-04-06 DIAGNOSIS — G47.33 OBSTRUCTIVE SLEEP APNEA, ADULT: Primary | ICD-10-CM

## 2018-04-06 DIAGNOSIS — R06.83 SNORING: ICD-10-CM

## 2018-04-06 DIAGNOSIS — I10 HYPERTENSION, UNSPECIFIED TYPE: ICD-10-CM

## 2018-04-11 NOTE — PROGRESS NOTES
PATIENT WAS AGREEABLE TO BEING SET UP ON PAP THERAPY. HE DID NOT HAVE A PREFERENCE OF A DME COMPANY SO NEXT IN ROTATION WITH HIS INSURANCE WILL BE [facility] 4/11/2018

## 2018-04-17 ENCOUNTER — TELEPHONE (OUTPATIENT)
Dept: FAMILY MEDICINE CLINIC | Facility: CLINIC | Age: 48
End: 2018-04-17

## 2018-04-17 DIAGNOSIS — I10 ESSENTIAL HYPERTENSION: ICD-10-CM

## 2018-04-17 RX ORDER — LOSARTAN POTASSIUM 100 MG/1
100 TABLET ORAL DAILY
Qty: 90 TABLET | Refills: 1 | Status: SHIPPED | OUTPATIENT
Start: 2018-04-17 | End: 2019-01-11 | Stop reason: ALTCHOICE

## 2018-04-17 NOTE — TELEPHONE ENCOUNTER
Refill sent.   ----- Message from Donna Mcgill MA sent at 4/17/2018  8:00 AM EDT -----  Regarding: FW: REFILL LOSARTIN  Contact: [phone]      ----- Message -----  From: Sanjuanita Brothers  Sent: 4/16/2018   1:49 PM  To: Gerri Long MA  Subject: REFILL LOSARTIN                                  HIS B/P MEDICATION  AT [facility] WILL NOT BE COVERED THRU [facility].  HE HAS TO GET HIS B/P MEDICATION AT [facility].  LOSARTIN 100MG.  PLEASE CALL [facility] TO REFILL.

## 2018-07-17 ENCOUNTER — OFFICE VISIT (OUTPATIENT)
Dept: SLEEP MEDICINE | Facility: HOSPITAL | Age: 48
End: 2018-07-17

## 2018-07-17 VITALS
DIASTOLIC BLOOD PRESSURE: 88 MMHG | OXYGEN SATURATION: 96 % | SYSTOLIC BLOOD PRESSURE: 135 MMHG | WEIGHT: 218 LBS | HEIGHT: 69 IN | BODY MASS INDEX: 32.29 KG/M2 | HEART RATE: 114 BPM

## 2018-07-17 DIAGNOSIS — G47.33 OSA (OBSTRUCTIVE SLEEP APNEA): Primary | ICD-10-CM

## 2018-07-17 DIAGNOSIS — G25.81 RESTLESS LEGS SYNDROME (RLS): ICD-10-CM

## 2018-07-17 PROCEDURE — 99213 OFFICE O/P EST LOW 20 MIN: CPT | Performed by: NURSE PRACTITIONER

## 2018-07-17 RX ORDER — ROPINIROLE 0.25 MG/1
0.25 TABLET, FILM COATED ORAL NIGHTLY
Qty: 30 TABLET | Refills: 3 | Status: SHIPPED | OUTPATIENT
Start: 2018-07-17 | End: 2019-01-11

## 2018-07-17 NOTE — PROGRESS NOTES
Subjective: Follow-up        Chief Complaint:   Chief Complaint   Patient presents with   • Follow-up       HPI:    Donte Leyva is a 47 y.o. male here for follow-up of CHARLES.Patient states he is continuing to be minimally sleepy throughout the day, his Lisbon score is 10 out of 24.  His greater than 4 hour usage is 14.6%, reports mask falling off his face of the middle the night and he does not put it back on.  AHI is 5.390% pressured 9.4.  He currently uses nasal pillows but would like to try a full face mask.    Patient also suffers from restless leg syndrome.  He is up many times in the night moving and kicking his legs.  He moves his right leg constantly throughout the night and this is interrupting his sleep.  He has tried Neurontin in the past without relief.        Current medications are:   Current Outpatient Prescriptions:   •  Ibuprofen (ADVIL PO), Take  by mouth., Disp: , Rfl:   •  losartan (COZAAR) 100 MG tablet, Take 1 tablet by mouth Daily., Disp: 90 tablet, Rfl: 1  •  sildenafil (VIAGRA) 100 MG tablet, Take 1 tablet by mouth Daily As Needed for erectile dysfunction (Take one half tablet once a day as needed)., Disp: 5 tablet, Rfl: 5  •  rOPINIRole (REQUIP) 0.25 MG tablet, Take 1 tablet by mouth Every Night. Take 1 hour before bedtime., Disp: 30 tablet, Rfl: 3.      The patient's relevant past medical, surgical, family and social history were reviewed and updated in Epic as appropriate.       Review of Systems   Constitutional: Positive for fatigue.   HENT: Negative.    Respiratory: Positive for apnea.    Cardiovascular: Negative.    Gastrointestinal: Negative.    Genitourinary:        ED   Musculoskeletal: Positive for arthralgias and myalgias.   Neurological: Negative.    Psychiatric/Behavioral: Positive for sleep disturbance.   All other systems reviewed and are negative.        Objective:    Physical Exam   Constitutional: He is oriented to person, place, and time. He appears well-developed and  well-nourished.   HENT:   Head: Normocephalic and atraumatic.   Mouth/Throat: Oropharynx is clear and moist.   Mallampati grade 3 anatomy   Eyes: Conjunctivae are normal.   Neck: Neck supple. No thyromegaly present.   Cardiovascular: Normal rate and regular rhythm.    Pulmonary/Chest: Effort normal and breath sounds normal.   Lymphadenopathy:     He has no cervical adenopathy.   Neurological: He is alert and oriented to person, place, and time.   Skin: Skin is warm and dry.   Psychiatric: He has a normal mood and affect. His behavior is normal. Judgment and thought content normal.   Nursing note and vitals reviewed.        ASSESSMENT/PLAN    Donte was seen today for follow-up.    Diagnoses and all orders for this visit:    CHARLES (obstructive sleep apnea)  -     CPAP Therapy    Restless legs syndrome (RLS)  -     rOPINIRole (REQUIP) 0.25 MG tablet; Take 1 tablet by mouth Every Night. Take 1 hour before bedtime.            1. Counseled patient regarding multimodal approach with healthy nutrition, healthy sleep, regular physical activity, social activities, counseling, and medications. Encouraged to practice lateral sleep position. Avoid alcohol and sedatives close to bedtime.  2. Renew supplies ×1 year  3. [employer] company to work with patient hopefully we can get a better fitting mask to help with his efficacy.  4. Requip 0.25 mg 1 by mouth daily #30 with 3 refills  5. Return to clinic in 3 months    I have reviewed the results of my evaluation and impression and discussed my recommendations in detail with the patient.      Signed by  CESARIO Dumont    July 17, 2018      CC: Catrachito Robles MD          No ref. provider found

## 2018-07-17 NOTE — PATIENT INSTRUCTIONS
Restless Legs Syndrome  Restless legs syndrome is a condition that causes uncomfortable feelings or sensations in the legs, especially while sitting or lying down. The sensations usually cause an overwhelming urge to move the legs. The arms can also sometimes be affected.  The condition can range from mild to severe. The symptoms often interfere with a person's ability to sleep.  What are the causes?  The cause of this condition is not known.  What increases the risk?  This condition is more likely to develop in:  · People who are older than age 50.  · Pregnant women. In general, restless legs syndrome is more common in women than in men.  · People who have a family history of the condition.  · People who have certain medical conditions, such as iron deficiency, kidney disease, Parkinson disease, or nerve damage.  · People who take certain medicines, such as medicines for high blood pressure, nausea, colds, allergies, depression, and some heart conditions.    What are the signs or symptoms?  The main symptom of this condition is uncomfortable sensations in the legs. These sensations may be:  · Described as pulling, tingling, prickling, throbbing, crawling, or burning.  · Worse while you are sitting or lying down.  · Worse during periods of rest or inactivity.  · Worse at night, often interfering with your sleep.  · Accompanied by a very strong urge to move your legs.  · Temporarily relieved by movement of your legs.    The sensations usually affect both sides of the body. The arms can also be affected, but this is rare. People who have this condition often have tiredness during the day because of their lack of sleep at night.  How is this diagnosed?  This condition may be diagnosed based on your description of the symptoms. You may also have tests, including blood tests, to check for other conditions that may lead to your symptoms. In some cases, you may be asked to spend some time in a sleep lab so your sleeping  can be monitored.  How is this treated?  Treatment for this condition is focused on managing the symptoms. Treatment may include:  · Self-help and lifestyle changes.  · Medicines.    Follow these instructions at home:  · Take medicines only as directed by your health care provider.  · Try these methods to get temporary relief from the uncomfortable sensations:  ? Massage your legs.  ? Walk or stretch.  ? Take a cold or hot bath.  · Practice good sleep habits. For example, go to bed and get up at the same time every day.  · Exercise regularly.  · Practice ways of relaxing, such as yoga or meditation.  · Avoid caffeine and alcohol.  · Do not use any tobacco products, including cigarettes, chewing tobacco, or electronic cigarettes. If you need help quitting, ask your health care provider.  · Keep all follow-up visits as directed by your health care provider. This is important.  Contact a health care provider if:  Your symptoms do not improve with treatment, or they get worse.  This information is not intended to replace advice given to you by your health care provider. Make sure you discuss any questions you have with your health care provider.  Document Released: 12/08/2003 Document Revised: 05/25/2017 Document Reviewed: 12/14/2015  [employer] Interactive Patient Education © 2018 [employer]  Sleep Apnea  Sleep apnea is a condition that affects breathing. People with sleep apnea have moments during sleep when their breathing pauses briefly or gets shallow. Sleep apnea can cause these symptoms:  · Trouble staying asleep.  · Sleepiness or tiredness during the day.  · Irritability.  · Loud snoring.  · Morning headaches.  · Trouble concentrating.  · Forgetting things.  · Less interest in sex.  · Being sleepy for no reason.  · Mood swings.  · Personality changes.  · Depression.  · Waking up a lot during the night to pee (urinate).  · Dry mouth.  · Sore throat.    Follow these instructions at home:  · Make any changes in  your routine that your doctor recommends.  · Eat a healthy, well-balanced diet.  · Take over-the-counter and prescription medicines only as told by your doctor.  · Avoid using alcohol, calming medicines (sedatives), and narcotic medicines.  · Take steps to lose weight if you are overweight.  · If you were given a machine (device) to use while you sleep, use it only as told by your doctor.  · Do not use any tobacco products, such as cigarettes, chewing tobacco, and e-cigarettes. If you need help quitting, ask your doctor.  · Keep all follow-up visits as told by your doctor. This is important.  Contact a doctor if:  · The machine that you were given to use during sleep is uncomfortable or does not seem to be working.  · Your symptoms do not get better.  · Your symptoms get worse.  Get help right away if:  · Your chest hurts.  · You have trouble breathing in enough air (shortness of breath).  · You have an uncomfortable feeling in your back, arms, or stomach.  · You have trouble talking.  · One side of your body feels weak.  · A part of your face is hanging down (drooping).  These symptoms may be an emergency. Do not wait to see if the symptoms will go away. Get medical help right away. Call your local emergency services (911 in the [place] Do not drive yourself to the hospital.  This information is not intended to replace advice given to you by your health care provider. Make sure you discuss any questions you have with your health care provider.  Document Released: 09/26/2009 Document Revised: 08/13/2017 Document Reviewed: 09/26/2016  [employer] Interactive Patient Education © 2018 [employer]

## 2018-08-06 ENCOUNTER — OFFICE VISIT (OUTPATIENT)
Dept: FAMILY MEDICINE CLINIC | Facility: CLINIC | Age: 48
End: 2018-08-06

## 2018-08-06 VITALS
HEIGHT: 69 IN | RESPIRATION RATE: 16 BRPM | TEMPERATURE: 98.3 F | BODY MASS INDEX: 31.31 KG/M2 | WEIGHT: 211.4 LBS | SYSTOLIC BLOOD PRESSURE: 138 MMHG | OXYGEN SATURATION: 98 % | DIASTOLIC BLOOD PRESSURE: 84 MMHG | HEART RATE: 93 BPM

## 2018-08-06 DIAGNOSIS — M51.26 HERNIATION OF INTERVERTEBRAL DISC OF LUMBAR SPINE: Primary | ICD-10-CM

## 2018-08-06 PROCEDURE — 99214 OFFICE O/P EST MOD 30 MIN: CPT | Performed by: FAMILY MEDICINE

## 2018-08-06 RX ORDER — DEXAMETHASONE 0.5 MG/1
0.5 TABLET ORAL
Refills: 0 | COMMUNITY
Start: 2018-08-03 | End: 2019-01-11

## 2018-08-06 RX ORDER — OXYCODONE HYDROCHLORIDE 5 MG/1
5 TABLET ORAL AS NEEDED
Refills: 0 | COMMUNITY
Start: 2018-08-03 | End: 2018-08-09

## 2018-08-06 RX ORDER — HYDROCODONE BITARTRATE AND ACETAMINOPHEN 7.5; 325 MG/1; MG/1
1 TABLET ORAL EVERY 6 HOURS PRN
Qty: 60 TABLET | Refills: 0
Start: 2018-08-06 | End: 2019-01-11

## 2018-08-06 NOTE — PROGRESS NOTES
"Subjective   Donte Leyva is a 47 y.o. male.     History of Present Illness   The patient is here for a follow up from [facility] for Herniated disc.    States he was admitted to [facility] due to one herniated disc and two bulging disc on his back.  Was given a steroid injection on 08/03/18. Discussed surgery but no plans for that at this time.  Was Admitted on and discharged on 08/03/18 with orders for bed rest and no lifting.  Initially at time of admission the patient had severe pain in the left lumbar area in the left anterior thigh.  The pain is now improved greatly.  He is taking oxycodone for pain relief.  He still has numbness in the left anterior thigh.  He is able to walk slowly.  He is unable to lift.  Urination and bowel habits of been normal.  His pain management physician is Dr.Osama Mariano Eastman.  His neurosurgeon is Dr. Dodson.  He is scheduled to see his pain management physician again on August 22.  The patient is using a walker and is being provided to him physical therapy through home health nursing.  He is employed at [employer].  He says he has difficulty getting in and out of a car.  The patient is being treated with a 5 day course of dexamethasone 0.5 mg tablets.    Taking Oxycodone 5 mg 2 tablets every 4 hours.. He pain level is at a 7 right now.  Can walk but slowly. Numbness and weakness in the left leg from his lower back to the knee.  Hurts getting in and out of the car.          The following portions of the patient's history were reviewed and updated as appropriate: allergies, current medications, past social history and problem list.    Review of Systems   Respiratory: Negative for shortness of breath.    Cardiovascular: Negative for chest pain.   Musculoskeletal: Positive for back pain.   Neurological: Positive for weakness (left leg) and numbness (left leg).       Objective   /84   Pulse 93   Temp 98.3 °F (36.8 °C)   Resp 16   Ht 175.3 cm (69\")   Wt 95.9 kg (211 lb 6.4 oz)   " SpO2 98%   BMI 31.22 kg/m²   Physical Exam   Constitutional: He appears well-developed and well-nourished.   Musculoskeletal: Normal range of motion.   Flexion and extension is 3 plus at the thigh ,knee and ankle.   Neurological:   Reflex Scores:       Patellar reflexes are 3+ on the right side and 3+ on the left side.  Nursing note and vitals reviewed.      Assessment/Plan   Problem List Items Addressed This Visit     None      Visit Diagnoses     Herniation of intervertebral disc of lumbar spine    -  Primary              Drink plenty fluids.  Do PT as scheduled.    Wean down on the Oxycodone then change to Hydrocodone.  Written Rx for Hydrocodone 7.5/325  mg  every 6 hours as needed. #60 +0.  [facility] # [id number] reviewed.     See Pain management on 08/22 as scheduled.    Follow up in 3 days.                Scribed for Dr Catrachito Robles by Lisa Garcia CMA.          I, Catrachito Robles MD, personally performed the services described in this documentation, as scribed by Lisa Garcia in my presence, and is both accurate and complete.

## 2018-08-09 ENCOUNTER — OFFICE VISIT (OUTPATIENT)
Dept: FAMILY MEDICINE CLINIC | Facility: CLINIC | Age: 48
End: 2018-08-09

## 2018-08-09 VITALS
TEMPERATURE: 97.6 F | OXYGEN SATURATION: 98 % | DIASTOLIC BLOOD PRESSURE: 82 MMHG | HEIGHT: 69 IN | HEART RATE: 83 BPM | RESPIRATION RATE: 18 BRPM | WEIGHT: 212 LBS | BODY MASS INDEX: 31.4 KG/M2 | SYSTOLIC BLOOD PRESSURE: 130 MMHG

## 2018-08-09 DIAGNOSIS — E11.9 TYPE 2 DIABETES MELLITUS WITHOUT COMPLICATION, WITHOUT LONG-TERM CURRENT USE OF INSULIN (HCC): ICD-10-CM

## 2018-08-09 DIAGNOSIS — M51.26 LUMBAR DISC HERNIATION: Primary | ICD-10-CM

## 2018-08-09 LAB — GLUCOSE BLDC GLUCOMTR-MCNC: 129 MG/DL (ref 70–130)

## 2018-08-09 PROCEDURE — 82962 GLUCOSE BLOOD TEST: CPT | Performed by: FAMILY MEDICINE

## 2018-08-09 PROCEDURE — 99213 OFFICE O/P EST LOW 20 MIN: CPT | Performed by: FAMILY MEDICINE

## 2018-08-09 RX ORDER — PREDNISONE 20 MG/1
60 TABLET ORAL DAILY
Refills: 0 | COMMUNITY
Start: 2018-07-30 | End: 2019-01-11

## 2018-08-09 NOTE — PROGRESS NOTES
"Subjective   Donte Leyva is a 47 y.o. male.     History of Present Illness   The patient is here today for a follow up low back pain due to herniated disc.  Pain in his left leg is improved.  He is reduced his oxycodone down to 1 tablet 3 times a day and is ready to switch over to hydrocodone.  The numbness is improved in his left thigh as well.    States his back pain is better with resting. He is off the dexamethasone. Taking Hydrocodone 7.5/325 mg 3 times a day as needed.   Will Follow up with pain management on 08/22/18.  Denies any chest pain or shortness of breath.    Also wanting to discuss elevated glucoses.  A1C was 6.2 on 07/30/18 at [facility].  Fasting glucose today is 129.  Positive family history for diabetes.    The following portions of the patient's history were reviewed and updated as appropriate: allergies, current medications, past social history and problem list.    Review of Systems   Respiratory: Negative for shortness of breath.    Cardiovascular: Negative for chest pain.   Musculoskeletal: Positive for back pain.       Objective   /82   Pulse 83   Temp 97.6 °F (36.4 °C) (Tympanic)   Resp 18   Ht 175.3 cm (69\")   Wt 96.2 kg (212 lb)   SpO2 98%   BMI 31.31 kg/m²   Physical Exam   Constitutional: He appears well-developed and well-nourished.   Neurological:   Deep tendon reflexes at the knees are 3+ and equal.  Strength with extension of the left thigh is 4+ out of 5 compared to 5 out of 5 on the right side.   Nursing note and vitals reviewed.      Assessment/Plan   Problem List Items Addressed This Visit     None      Visit Diagnoses     Lumbar disc herniation    -  Primary    Type 2 diabetes mellitus without complication, without long-term current use of insulin (CMS/[facility])            Fasting glucose today is 129.  It appears that he does have type 2 diabetes.  He is to work on losing weight by observing a low carbohydrate diet and by getting increase fiber content in his diet.  " Exercises on a daily basis.  Work on losing 10 pounds.    His sciatica appears to be much improved.  Switch over to hydrocodone and gradually wean off of that medication over the next 1-2 weeks.  We will either start back to work with the limited to schedule of activities tomorrow on the 10th day of August.  He is to lift no more than 15 pounds at a time.  He has an appointment to see his pain management physician on August 22.  We will have him return to see us here in 2 months.  Return to see us if needed as well.      Drink plenty fluids.  Work on diet and weight loss..  Cut back on carbohydrates.    OK to return to work tomorrow with no lifting over 15 pounds.    Continue medications as doing.    Follow up in 2 months. Sooner if needed.                Scribed for Dr Catrachito Robles by Lisa Garcia CMA.          I, Catrachito Robles MD, personally performed the services described in this documentation, as scribed by Lisa Garcia in my presence, and is both accurate and complete.

## 2019-01-11 ENCOUNTER — OFFICE VISIT (OUTPATIENT)
Dept: FAMILY MEDICINE CLINIC | Facility: CLINIC | Age: 49
End: 2019-01-11

## 2019-01-11 ENCOUNTER — TELEPHONE (OUTPATIENT)
Dept: FAMILY MEDICINE CLINIC | Facility: CLINIC | Age: 49
End: 2019-01-11

## 2019-01-11 VITALS
DIASTOLIC BLOOD PRESSURE: 88 MMHG | HEIGHT: 69 IN | TEMPERATURE: 97.7 F | SYSTOLIC BLOOD PRESSURE: 130 MMHG | OXYGEN SATURATION: 98 % | HEART RATE: 95 BPM | RESPIRATION RATE: 20 BRPM | WEIGHT: 211.2 LBS | BODY MASS INDEX: 31.28 KG/M2

## 2019-01-11 DIAGNOSIS — Z79.4 TYPE 2 DIABETES MELLITUS WITHOUT COMPLICATION, WITH LONG-TERM CURRENT USE OF INSULIN (HCC): ICD-10-CM

## 2019-01-11 DIAGNOSIS — E11.9 TYPE 2 DIABETES MELLITUS WITHOUT COMPLICATION, WITH LONG-TERM CURRENT USE OF INSULIN (HCC): ICD-10-CM

## 2019-01-11 DIAGNOSIS — I10 ESSENTIAL HYPERTENSION: Primary | ICD-10-CM

## 2019-01-11 DIAGNOSIS — F52.21 ED (ERECTILE DYSFUNCTION) OF NON-ORGANIC ORIGIN: ICD-10-CM

## 2019-01-11 LAB — HBA1C MFR BLD: 6.2 %

## 2019-01-11 PROCEDURE — 83036 HEMOGLOBIN GLYCOSYLATED A1C: CPT | Performed by: FAMILY MEDICINE

## 2019-01-11 PROCEDURE — 99214 OFFICE O/P EST MOD 30 MIN: CPT | Performed by: FAMILY MEDICINE

## 2019-01-11 RX ORDER — TADALAFIL 5 MG
TABLET ORAL
Refills: 0 | COMMUNITY
Start: 2018-12-26 | End: 2019-01-11 | Stop reason: ALTCHOICE

## 2019-01-11 RX ORDER — LISINOPRIL 40 MG/1
40 TABLET ORAL DAILY
Qty: 30 TABLET | Refills: 11 | Status: SHIPPED | OUTPATIENT
Start: 2019-01-11 | End: 2019-02-11 | Stop reason: SINTOL

## 2019-01-11 RX ORDER — TADALAFIL 20 MG/1
20 TABLET ORAL DAILY PRN
Qty: 1 TABLET | Refills: 11 | Status: SHIPPED | OUTPATIENT
Start: 2019-01-11 | End: 2019-03-31 | Stop reason: SDUPTHER

## 2019-01-11 NOTE — PROGRESS NOTES
"Subjective   Donte Leyva is a 48 y.o. male seen today for Diabetes and Med Refill.     History of Present Illness    The patient is here today for a follow up on Hypertension and Hypergylcemia.    States he is doing well.  Denies any chest pain or shortness of breath.    BP today is 130/88.  Taking Losartan 100 mg daily.    A1C today is 6.2%. This is the same as in July.    States the Sildenafil caused side effects. Wanting to go back to Cialis 20 mg one tablet instead of the 5 mg at 4 tablets.    The following portions of the patient's history were reviewed and updated as appropriate: allergies, current medications, past social history and problem list.    Review of Systems   Constitutional: Negative for unexpected weight change.   Respiratory: Negative for shortness of breath.    Cardiovascular: Negative for chest pain.   Genitourinary:        Erectile dysfunction   Neurological: Negative for numbness.       Objective   /88   Pulse 95   Temp 97.7 °F (36.5 °C) (Temporal)   Resp 20   Ht 175.3 cm (69\")   Wt 95.8 kg (211 lb 3.2 oz)   SpO2 98%   BMI 31.19 kg/m²   Physical Exam   Constitutional: He is oriented to person, place, and time. He appears well-developed and well-nourished.   Cardiovascular: Normal rate and regular rhythm.   No murmur heard.  Pulmonary/Chest: Effort normal and breath sounds normal. He has no rales.   Neurological: He is alert and oriented to person, place, and time.   Psychiatric: He has a normal mood and affect.   Nursing note and vitals reviewed.      Assessment/Plan   Problem List Items Addressed This Visit        Cardiovascular and Mediastinum    Hypertension - Primary       Other    ED (erectile dysfunction) of non-organic origin      Other Visit Diagnoses     Type 2 diabetes mellitus without complication, with long-term current use of insulin (CMS/[facility])        Relevant Orders    POC Glycosylated Hemoglobin (Hb A1C) (Completed)              Drink plenty fluids.  Work on diet " and weight loss.  Cut back on carbohydrates such as breads, potatoes, pastas and desserts.  Eat more  Fruits, vegetables, and lean meats such a fish and chicken.    Use Tylenol for headaches instead of the Ibuprofen.    Change Losartan 100 mg to Lisinopril 40 mg daily. #30+11.    Continue other medications as doing.    Refill Cialis at 20 mg as needed.    Follow up in 6 months fasting.                Scribed for Dr Catrachito Rboles by Lisa Garcia CMA.          I, Catrachito Robles MD, personally performed the services described in this documentation, as scribed by Lisa Garcia in my presence, and is both accurate and complete.        (Please note that portions of this note were completed with a voice recognition program. Efforts were made to edit the dictations,but occasionally words are mis transcribed.)

## 2019-01-11 NOTE — PATIENT INSTRUCTIONS
"Carbohydrate Counting for Diabetes Mellitus, Adult  Carbohydrate counting is a method for keeping track of how many carbohydrates you eat. Eating carbohydrates naturally increases the amount of sugar (glucose) in the blood. Counting how many carbohydrates you eat helps keep your blood glucose within normal limits, which helps you manage your diabetes (diabetes mellitus).  It is important to know how many carbohydrates you can safely have in each meal. This is different for every person. A diet and nutrition specialist (registered dietitian) can help you make a meal plan and calculate how many carbohydrates you should have at each meal and snack.  Carbohydrates are found in the following foods:  · Grains, such as breads and cereals.  · Dried beans and soy products.  · Starchy vegetables, such as potatoes, peas, and corn.  · Fruit and fruit juices.  · Milk and yogurt.  · Sweets and snack foods, such as cake, cookies, candy, chips, and soft drinks.    How do I count carbohydrates?  There are two ways to count carbohydrates in food. You can use either of the methods or a combination of both.  Reading \"Nutrition Facts\" on packaged food  The \"Nutrition Facts\" list is included on the labels of almost all packaged foods and beverages in the [place] It includes:  · The serving size.  · Information about nutrients in each serving, including the grams (g) of carbohydrate per serving.    To use the “Nutrition Facts\":  · Decide how many servings you will have.  · Multiply the number of servings by the number of carbohydrates per serving.  · The resulting number is the total amount of carbohydrates that you will be having.    Learning standard serving sizes of other foods  When you eat foods containing carbohydrates that are not packaged or do not include \"Nutrition Facts\" on the label, you need to measure the servings in order to count the amount of carbohydrates:  · Measure the foods that you will eat with a food scale or " measuring cup, if needed.  · Decide how many standard-size servings you will eat.  · Multiply the number of servings by 15. Most carbohydrate-rich foods have about 15 g of carbohydrates per serving.  ? For example, if you eat 8 oz (170 g) of strawberries, you will have eaten 2 servings and 30 g of carbohydrates (2 servings x 15 g = 30 g).  · For foods that have more than one food mixed, such as soups and casseroles, you must count the carbohydrates in each food that is included.    The following list contains standard serving sizes of common carbohydrate-rich foods. Each of these servings has about 15 g of carbohydrates:  · ½ hamburger bun or ½ English muffin.  · ½ oz (15 mL) syrup.  · ½ oz (14 g) jelly.  · 1 slice of bread.  · 1 six-inch tortilla.  · 3 oz (85 g) cooked rice or pasta.  · 4 oz (113 g) cooked dried beans.  · 4 oz (113 g) starchy vegetable, such as peas, corn, or potatoes.  · 4 oz (113 g) hot cereal.  · 4 oz (113 g) mashed potatoes or ¼ of a large baked potato.  · 4 oz (113 g) canned or frozen fruit.  · 4 oz (120 mL) fruit juice.  · 4-6 crackers.  · 6 chicken nuggets.  · 6 oz (170 g) unsweetened dry cereal.  · 6 oz (170 g) plain fat-free yogurt or yogurt sweetened with artificial sweeteners.  · 8 oz (240 mL) milk.  · 8 oz (170 g) fresh fruit or one small piece of fruit.  · 24 oz (680 g) popped popcorn.    Example of carbohydrate counting  Sample meal  · 3 oz (85 g) chicken breast.  · 6 oz (170 g) brown rice.  · 4 oz (113 g) corn.  · 8 oz (240 mL) milk.  · 8 oz (170 g) strawberries with sugar-free whipped topping.  Carbohydrate calculation  1. Identify the foods that contain carbohydrates:  ? Rice.  ? Corn.  ? Milk.  ? Strawberries.  2. Calculate how many servings you have of each food:  ? 2 servings rice.  ? 1 serving corn.  ? 1 serving milk.  ? 1 serving strawberries.  3. Multiply each number of servings by 15 g:  ? 2 servings rice x 15 g = 30 g.  ? 1 serving corn x 15 g = 15 g.  ? 1 serving milk x 15  g = 15 g.  ? 1 serving strawberries x 15 g = 15 g.  4. Add together all of the amounts to find the total grams of carbohydrates eaten:  ? 30 g + 15 g + 15 g + 15 g = 75 g of carbohydrates total.  This information is not intended to replace advice given to you by your health care provider. Make sure you discuss any questions you have with your health care provider.  Document Released: 12/18/2006 Document Revised: 07/07/2017 Document Reviewed: 05/31/2017  [employer] Interactive Patient Education © 2018 [employer]  Type 2 Diabetes Mellitus, Diagnosis, Adult  Type 2 diabetes (type 2 diabetes mellitus) is a long-term (chronic) disease. It may be caused by one or both of these problems:  · Your body does not make enough of a hormone called insulin.  · Your body does not react in a normal way to insulin that it makes.    Insulin lets sugars (glucose) go into cells in the body. This gives you energy. If you have type 2 diabetes, sugars cannot get into cells. This causes high blood sugar (hyperglycemia).  Your doctor will set treatment goals for you. Generally, you should have these blood sugar levels:  · Before meals (preprandial):  mg/dL (4.4-7.2 mmol/L).  · After meals (postprandial): below 180 mg/dL (10 mmol/L).  · A1c (hemoglobin A1c) level: less than 7%.    Follow these instructions at home:  Questions to Ask Your Doctor    You may want to ask these questions:  · Do I need to meet with a diabetes educator?  · Where can I find a support group for people with diabetes?  · What equipment will I need to care for myself at home?  · What diabetes medicines do I need? When should I take them?  · How often do I need to check my blood sugar?  · What number can I call if I have questions?  · When is my next doctor's visit?    General instructions  · Take over-the-counter and prescription medicines only as told by your doctor.  · Keep all follow-up visits as told by your doctor. This is important.  Contact a doctor  if:  · Your blood sugar is at or above 240 mg/dL (13.3 mmol/L) for 2 days in a row.  · You have been sick or have had a fever for 2 days or more and you are not getting better.  · You have any of these problems for more than 6 hours:  ? You cannot eat or drink.  ? You feel sick to your stomach (nauseous).  ? You throw up (vomit).  ? You have watery poop (diarrhea).  Get help right away if:  · Your blood sugar is lower than 54 mg/dL (3 mmol/L).  · You get confused.  · You have trouble:  ? Thinking clearly.  ? Breathing.  · You have moderate or large ketone levels in your pee (urine).  This information is not intended to replace advice given to you by your health care provider. Make sure you discuss any questions you have with your health care provider.  Document Released: 09/26/2009 Document Revised: 05/25/2017 Document Reviewed: 01/20/2017  [employer] Interactive Patient Education © 2018 [employer]

## 2019-01-11 NOTE — TELEPHONE ENCOUNTER
SPOKE TO PT TO LET HIM KNOW THAT DR APARICIO WANTS HIM TO RTC TO RECHECK HIS DIABETES. PT HAS AN APPOINTMENT AT 3:45 TODAY----- Message from Scarlett Biggs sent at 1/7/2019 10:17 AM EST -----  PT NEEDS A NEW RX FOR CIALIS 5MG, OFFERED  APPT HE DECLINED. [facility] IN [place]

## 2019-01-14 DIAGNOSIS — F52.21 ED (ERECTILE DYSFUNCTION) OF NON-ORGANIC ORIGIN: ICD-10-CM

## 2019-01-14 RX ORDER — TADALAFIL 5 MG
TABLET ORAL
Qty: 30 TABLET | Refills: 5 | Status: SHIPPED | OUTPATIENT
Start: 2019-01-14 | End: 2019-02-11 | Stop reason: DRUGHIGH

## 2019-02-11 ENCOUNTER — OFFICE VISIT (OUTPATIENT)
Dept: FAMILY MEDICINE CLINIC | Facility: CLINIC | Age: 49
End: 2019-02-11

## 2019-02-11 VITALS
HEIGHT: 69 IN | RESPIRATION RATE: 20 BRPM | SYSTOLIC BLOOD PRESSURE: 156 MMHG | BODY MASS INDEX: 32.14 KG/M2 | HEART RATE: 100 BPM | TEMPERATURE: 98.1 F | WEIGHT: 217 LBS | OXYGEN SATURATION: 98 % | DIASTOLIC BLOOD PRESSURE: 98 MMHG

## 2019-02-11 DIAGNOSIS — I10 ESSENTIAL HYPERTENSION: Primary | ICD-10-CM

## 2019-02-11 PROCEDURE — 99213 OFFICE O/P EST LOW 20 MIN: CPT | Performed by: FAMILY MEDICINE

## 2019-02-11 RX ORDER — OLMESARTAN MEDOXOMIL 20 MG/1
20 TABLET ORAL DAILY
Qty: 30 TABLET | Refills: 5 | Status: SHIPPED | OUTPATIENT
Start: 2019-02-11 | End: 2020-01-30 | Stop reason: SDUPTHER

## 2019-02-11 NOTE — PROGRESS NOTES
"Subjective   Donte Leyva is a 48 y.o. male seen today for Hypertension.     History of Present Illness   The patient is here for a follow up on Hypertension.    States he stopped the Lisinopril due to side effects. Made him dizzy, off balance, and lethargic.  Denies any chest pain or shortness of breath.    BP today is 156/98.        The following portions of the patient's history were reviewed and updated as appropriate: allergies, current medications, past social history and problem list.    Review of Systems   Respiratory: Negative for shortness of breath.    Cardiovascular: Negative for chest pain.   Neurological: Positive for dizziness and weakness.       Objective   /98   Pulse 100   Temp 98.1 °F (36.7 °C) (Temporal)   Resp 20   Ht 175.3 cm (69\")   Wt 98.4 kg (217 lb)   SpO2 98%   BMI 32.05 kg/m²   Physical Exam   Constitutional: He appears well-developed and well-nourished.   Cardiovascular: Normal rate and regular rhythm.   Pulmonary/Chest: Effort normal and breath sounds normal.   Nursing note and vitals reviewed.      Assessment/Plan   Problem List Items Addressed This Visit        Cardiovascular and Mediastinum    Hypertension - Primary    Relevant Medications    olmesartan (BENICAR) 20 MG tablet              Drink plenty fluids.    Stay off the Lisinopril.    Rx for Olmesartan 20 mg daily #30+5.    Follow up in 4 weeks. Sooner if needed.                Scribed for Dr Catrachito Robles by Lisa Garcia CMA.          I, Catrachito Robles MD, personally performed the services described in this documentation, as scribed by Lisa Garcia in my presence, and is both accurate and complete.        (Please note that portions of this note were completed with a voice recognition program. Efforts were made to edit the dictations,but occasionally words are mis transcribed.)      "

## 2019-03-28 ENCOUNTER — TELEPHONE (OUTPATIENT)
Dept: FAMILY MEDICINE CLINIC | Facility: CLINIC | Age: 49
End: 2019-03-28

## 2019-04-01 RX ORDER — TADALAFIL 20 MG/1
TABLET ORAL
Qty: 1 TABLET | Refills: 11 | Status: SHIPPED | OUTPATIENT
Start: 2019-04-01 | End: 2020-01-30

## 2019-11-14 ENCOUNTER — OFFICE VISIT (OUTPATIENT)
Dept: FAMILY MEDICINE CLINIC | Facility: CLINIC | Age: 49
End: 2019-11-14

## 2019-11-14 VITALS
HEART RATE: 106 BPM | HEIGHT: 69 IN | RESPIRATION RATE: 20 BRPM | DIASTOLIC BLOOD PRESSURE: 104 MMHG | TEMPERATURE: 98.7 F | BODY MASS INDEX: 32.58 KG/M2 | WEIGHT: 220 LBS | OXYGEN SATURATION: 98 % | SYSTOLIC BLOOD PRESSURE: 148 MMHG

## 2019-11-14 DIAGNOSIS — J40 BRONCHITIS: Primary | ICD-10-CM

## 2019-11-14 PROCEDURE — 99213 OFFICE O/P EST LOW 20 MIN: CPT | Performed by: FAMILY MEDICINE

## 2019-11-14 RX ORDER — PROMETHAZINE HYDROCHLORIDE AND CODEINE PHOSPHATE 6.25; 1 MG/5ML; MG/5ML
5 SYRUP ORAL EVERY 6 HOURS PRN
Qty: 120 ML | Refills: 0
Start: 2019-11-14 | End: 2020-01-30

## 2019-11-14 RX ORDER — AMOXICILLIN AND CLAVULANATE POTASSIUM 875; 125 MG/1; MG/1
1 TABLET, FILM COATED ORAL 2 TIMES DAILY
Qty: 20 TABLET | Refills: 0 | Status: SHIPPED | OUTPATIENT
Start: 2019-11-14 | End: 2020-01-30

## 2019-11-14 NOTE — PROGRESS NOTES
"Subjective   Donte Leyva is a 49 y.o. male seen today for Cough; Fever; and Generalized Body Aches.     Cough   This is a new problem. The current episode started 1 to 4 weeks ago. The problem has been unchanged. The cough is productive of sputum (clear). Associated symptoms include chills, a fever, myalgias, postnasal drip, rhinorrhea and a sore throat. Pertinent negatives include no chest pain or shortness of breath. The symptoms are aggravated by lying down. He has tried OTC cough suppressant and rest for the symptoms. The treatment provided mild relief.      BP today is 148/104.  Taking Benicar 20 mg daily.      The following portions of the patient's history were reviewed and updated as appropriate: allergies, current medications, past social history and problem list.    Review of Systems   Constitutional: Positive for chills, fatigue and fever.   HENT: Positive for congestion, postnasal drip, rhinorrhea and sore throat.    Respiratory: Positive for cough. Negative for shortness of breath.    Cardiovascular: Negative for chest pain.   Musculoskeletal: Positive for myalgias.       Objective   BP (!) 148/104   Pulse 106   Temp 98.7 °F (37.1 °C)   Resp 20   Ht 175.3 cm (69\")   Wt 99.8 kg (220 lb)   SpO2 98%   BMI 32.49 kg/m²   Physical Exam   Constitutional: He appears well-developed and well-nourished.   HENT:   Right Ear: External ear normal.   Left Ear: External ear normal.   Mouth/Throat: Oropharynx is clear and moist.   Pulmonary/Chest:   Congestion right lung     Nursing note and vitals reviewed.      Assessment/Plan   Problem List Items Addressed This Visit     None      Visit Diagnoses     Bronchitis    -  Primary    Relevant Medications    amoxicillin-clavulanate (AUGMENTIN) 875-125 MG per tablet    promethazine-codeine (PHENERGAN with CODEINE) 6.25-10 MG/5ML syrup              Rest and Drink plenty fluids.    Rx for Augmentin 875 mg twice a day #20+0.  Rx for Promethazine with Codeine 1 tsp 4 " times a day as needed for cough. #120 ML+0.    Follow up in 3 weeks for a follow up on Blood pressure.          Scribed for Dr Catrachito Robles by Lisa Garcia CMA.          I, Catrachito Robles MD, personally performed the services described in this documentation, as scribed by Lisa Garcia in my presence, and is both accurate and complete.        (Please note that portions of this note were completed with a voice recognition program. Efforts were made to edit the dictations,but occasionally words are mis transcribed.)

## 2020-01-13 ENCOUNTER — TELEPHONE (OUTPATIENT)
Dept: FAMILY MEDICINE CLINIC | Facility: CLINIC | Age: 50
End: 2020-01-13

## 2020-01-14 RX ORDER — SILDENAFIL CITRATE 20 MG/1
20 TABLET ORAL DAILY PRN
Qty: 30 TABLET | Refills: 0 | Status: SHIPPED | OUTPATIENT
Start: 2020-01-14 | End: 2020-01-30 | Stop reason: ALTCHOICE

## 2020-01-14 NOTE — TELEPHONE ENCOUNTER
Rx has been sent in for Sildenafil 20 mg, pt notified, also advised that he needs to rto to check on his hbp.  BBvidal, CMA

## 2020-01-24 DIAGNOSIS — F52.21 ED (ERECTILE DYSFUNCTION) OF NON-ORGANIC ORIGIN: ICD-10-CM

## 2020-01-24 RX ORDER — TADALAFIL 5 MG
TABLET ORAL
Qty: 30 TABLET | Refills: 5 | Status: SHIPPED | OUTPATIENT
Start: 2020-01-24 | End: 2020-01-30

## 2020-01-30 ENCOUNTER — OFFICE VISIT (OUTPATIENT)
Dept: FAMILY MEDICINE CLINIC | Facility: CLINIC | Age: 50
End: 2020-01-30

## 2020-01-30 VITALS
DIASTOLIC BLOOD PRESSURE: 76 MMHG | OXYGEN SATURATION: 95 % | SYSTOLIC BLOOD PRESSURE: 132 MMHG | HEIGHT: 69 IN | TEMPERATURE: 97.5 F | WEIGHT: 220 LBS | RESPIRATION RATE: 16 BRPM | BODY MASS INDEX: 32.58 KG/M2 | HEART RATE: 96 BPM

## 2020-01-30 DIAGNOSIS — F52.21 ED (ERECTILE DYSFUNCTION) OF NON-ORGANIC ORIGIN: ICD-10-CM

## 2020-01-30 DIAGNOSIS — I10 ESSENTIAL HYPERTENSION: Primary | ICD-10-CM

## 2020-01-30 DIAGNOSIS — E11.9 TYPE 2 DIABETES MELLITUS WITHOUT COMPLICATION, WITHOUT LONG-TERM CURRENT USE OF INSULIN (HCC): ICD-10-CM

## 2020-01-30 LAB — HBA1C MFR BLD: 6.9 %

## 2020-01-30 PROCEDURE — 83036 HEMOGLOBIN GLYCOSYLATED A1C: CPT | Performed by: FAMILY MEDICINE

## 2020-01-30 PROCEDURE — 99213 OFFICE O/P EST LOW 20 MIN: CPT | Performed by: FAMILY MEDICINE

## 2020-01-30 RX ORDER — OLMESARTAN MEDOXOMIL 20 MG/1
20 TABLET ORAL DAILY
Qty: 30 TABLET | Refills: 5 | Status: SHIPPED | OUTPATIENT
Start: 2020-01-30 | End: 2020-02-28 | Stop reason: SDUPTHER

## 2020-01-30 RX ORDER — TADALAFIL 5 MG/1
TABLET ORAL
Qty: 10 TABLET | Refills: 11 | Status: SHIPPED | OUTPATIENT
Start: 2020-01-30 | End: 2020-09-29 | Stop reason: SDUPTHER

## 2020-02-10 ENCOUNTER — TELEPHONE (OUTPATIENT)
Dept: FAMILY MEDICINE CLINIC | Facility: CLINIC | Age: 50
End: 2020-02-10

## 2020-02-10 NOTE — TELEPHONE ENCOUNTER
Received fax from insurance company requesting a PA be completed on patient's Metformin 500 mg tablets 1 BID. Completed PA: PA has been resolved is the message form Covermymeds.   Key: [id number]

## 2020-02-28 ENCOUNTER — OFFICE VISIT (OUTPATIENT)
Dept: FAMILY MEDICINE CLINIC | Facility: CLINIC | Age: 50
End: 2020-02-28

## 2020-02-28 VITALS
WEIGHT: 219 LBS | OXYGEN SATURATION: 97 % | SYSTOLIC BLOOD PRESSURE: 130 MMHG | RESPIRATION RATE: 17 BRPM | TEMPERATURE: 97.3 F | HEIGHT: 69 IN | DIASTOLIC BLOOD PRESSURE: 100 MMHG | BODY MASS INDEX: 32.44 KG/M2 | HEART RATE: 87 BPM

## 2020-02-28 DIAGNOSIS — I10 ESSENTIAL HYPERTENSION: ICD-10-CM

## 2020-02-28 DIAGNOSIS — J40 BRONCHITIS: Primary | ICD-10-CM

## 2020-02-28 PROCEDURE — 99213 OFFICE O/P EST LOW 20 MIN: CPT | Performed by: FAMILY MEDICINE

## 2020-02-28 RX ORDER — BENZONATATE 100 MG/1
100 CAPSULE ORAL 3 TIMES DAILY PRN
Qty: 20 CAPSULE | Refills: 0 | Status: SHIPPED | OUTPATIENT
Start: 2020-02-28

## 2020-02-28 RX ORDER — OLMESARTAN MEDOXOMIL 40 MG/1
40 TABLET ORAL DAILY
Qty: 30 TABLET | Refills: 5 | Status: SHIPPED | OUTPATIENT
Start: 2020-02-28 | End: 2021-03-01

## 2020-02-28 RX ORDER — AZITHROMYCIN 250 MG/1
TABLET, FILM COATED ORAL
Qty: 6 TABLET | Refills: 1 | Status: SHIPPED | OUTPATIENT
Start: 2020-02-28

## 2020-02-28 NOTE — PROGRESS NOTES
"Subjective   Donte Leyva is a 49 y.o. male seen today for Cough and URI.     Cough   This is a new problem. The current episode started yesterday. The cough is non-productive. Associated symptoms include postnasal drip. Pertinent negatives include no chest pain, chills, fever, sore throat or shortness of breath. The symptoms are aggravated by lying down. He has tried OTC cough suppressant (Mucinex, fidel seltzer and Nyquil.) for the symptoms. The treatment provided no relief.      States he did have some nausea and 2 days of diarrhea with Metformin but that has resolved.    The following portions of the patient's history were reviewed and updated as appropriate: allergies, current medications, past social history and problem list.    Review of Systems   Constitutional: Negative for chills and fever.   HENT: Positive for postnasal drip. Negative for sore throat.    Respiratory: Positive for cough. Negative for shortness of breath.    Cardiovascular: Negative for chest pain.       Objective   /100   Pulse 87   Temp 97.3 °F (36.3 °C)   Resp 17   Ht 175.3 cm (69\")   Wt 99.3 kg (219 lb)   SpO2 97%   BMI 32.34 kg/m²   Physical Exam   Constitutional: He appears well-developed and well-nourished.   HENT:   Right Ear: External ear normal.   Left Ear: External ear normal.   Mouth/Throat: Oropharynx is clear and moist.   Cardiovascular: Normal rate and regular rhythm.   Pulmonary/Chest: Effort normal and breath sounds normal.   Nursing note and vitals reviewed.      Assessment/Plan   Problem List Items Addressed This Visit        Cardiovascular and Mediastinum    Hypertension    Relevant Medications    olmesartan (BENICAR) 40 MG tablet      Other Visit Diagnoses     Bronchitis    -  Primary    Relevant Medications    benzonatate (TESSALON) 100 MG capsule    azithromycin (ZITHROMAX) 250 MG tablet              Drink plenty fluids.    Increase Olmesartan 20 mg daily up to 40 mg daily #30+5.  Continue other " medications as doing.    Rx for Azithromycin 250 mg Two today then 1 a day for 4 more days. #6+1.  Rx for Benzonate 100 mg  three times a day #20+0.    Follow up in 2 months.              Scribed for Dr Catrachito Robles by Lisa Garcia CMA.          I, Catrachito Robles MD, personally performed the services described in this documentation, as scribed by Lisa Garcia in my presence, and is both accurate and complete.        (Please note that portions of this note were completed with a voice recognition program. Efforts were made to edit the dictations,but occasionally words are mis transcribed.)

## 2020-09-29 DIAGNOSIS — F52.21 ED (ERECTILE DYSFUNCTION) OF NON-ORGANIC ORIGIN: ICD-10-CM

## 2020-09-30 RX ORDER — TADALAFIL 5 MG/1
TABLET ORAL
Qty: 10 TABLET | Refills: 2 | Status: SHIPPED | OUTPATIENT
Start: 2020-09-30 | End: 2021-02-17 | Stop reason: SDUPTHER

## 2021-02-17 DIAGNOSIS — F52.21 ED (ERECTILE DYSFUNCTION) OF NON-ORGANIC ORIGIN: ICD-10-CM

## 2021-02-17 RX ORDER — TADALAFIL 5 MG/1
TABLET ORAL
Qty: 10 TABLET | Refills: 2 | Status: SHIPPED | OUTPATIENT
Start: 2021-02-17

## 2021-02-17 NOTE — TELEPHONE ENCOUNTER
Caller: Donte Leyva    Relationship: Self    Best call back number: [phone]    Medication needed:   Requested Prescriptions     Pending Prescriptions Disp Refills   • tadalafil (CIALIS) 5 MG tablet 10 tablet 2     Sig: Take one every 3 days as needed.       When do you need the refill by: ASAP    What details did the patient provide when requesting the medication:     Does the patient have less than a 3 day supply:  [x] Yes  [] No    What is the patient's preferred pharmacy: [id number] [place] [id number] AT [facility] [address]  - [phone]  - [phone] FX

## 2021-02-28 DIAGNOSIS — I10 ESSENTIAL HYPERTENSION: ICD-10-CM

## 2021-03-01 RX ORDER — OLMESARTAN MEDOXOMIL 40 MG/1
TABLET ORAL
Qty: 30 TABLET | Refills: 0 | Status: SHIPPED | OUTPATIENT
Start: 2021-03-01